# Patient Record
Sex: FEMALE | Race: WHITE | NOT HISPANIC OR LATINO | Employment: FULL TIME | URBAN - METROPOLITAN AREA
[De-identification: names, ages, dates, MRNs, and addresses within clinical notes are randomized per-mention and may not be internally consistent; named-entity substitution may affect disease eponyms.]

---

## 2018-08-17 ENCOUNTER — HOSPITAL ENCOUNTER (EMERGENCY)
Facility: HOSPITAL | Age: 33
Discharge: HOME | End: 2018-08-17
Attending: EMERGENCY MEDICINE | Admitting: EMERGENCY MEDICINE

## 2018-08-17 ENCOUNTER — APPOINTMENT (OUTPATIENT)
Dept: ULTRASOUND IMAGING | Facility: HOSPITAL | Age: 33
End: 2018-08-17

## 2018-08-17 VITALS
SYSTOLIC BLOOD PRESSURE: 122 MMHG | HEIGHT: 66 IN | WEIGHT: 152 LBS | TEMPERATURE: 96.9 F | OXYGEN SATURATION: 99 % | BODY MASS INDEX: 24.43 KG/M2 | HEART RATE: 69 BPM | DIASTOLIC BLOOD PRESSURE: 80 MMHG | RESPIRATION RATE: 16 BRPM

## 2018-08-17 DIAGNOSIS — R10.13 EPIGASTRIC ABDOMINAL PAIN: Primary | ICD-10-CM

## 2018-08-17 LAB
ALBUMIN SERPL-MCNC: 4.2 G/DL (ref 3.5–5.2)
ALBUMIN/GLOB SERPL: 1.6 G/DL
ALP SERPL-CCNC: 41 U/L (ref 39–117)
ALT SERPL W P-5'-P-CCNC: 12 U/L (ref 1–33)
ANION GAP SERPL CALCULATED.3IONS-SCNC: 12.8 MMOL/L
AST SERPL-CCNC: 13 U/L (ref 1–32)
BASOPHILS # BLD AUTO: 0 10*3/MM3 (ref 0–0.2)
BASOPHILS NFR BLD AUTO: 0 % (ref 0–1.5)
BILIRUB SERPL-MCNC: 0.4 MG/DL (ref 0.1–1.2)
BUN BLD-MCNC: 13 MG/DL (ref 6–20)
BUN/CREAT SERPL: 20 (ref 7–25)
CALCIUM SPEC-SCNC: 8.4 MG/DL (ref 8.6–10.5)
CHLORIDE SERPL-SCNC: 103 MMOL/L (ref 98–107)
CO2 SERPL-SCNC: 23.2 MMOL/L (ref 22–29)
CREAT BLD-MCNC: 0.65 MG/DL (ref 0.57–1)
DEPRECATED RDW RBC AUTO: 42.6 FL (ref 37–54)
EOSINOPHIL # BLD AUTO: 0.28 10*3/MM3 (ref 0–0.7)
EOSINOPHIL NFR BLD AUTO: 3.9 % (ref 0.3–6.2)
ERYTHROCYTE [DISTWIDTH] IN BLOOD BY AUTOMATED COUNT: 12.6 % (ref 11.7–13)
GFR SERPL CREATININE-BSD FRML MDRD: 106 ML/MIN/1.73
GLOBULIN UR ELPH-MCNC: 2.7 GM/DL
GLUCOSE BLD-MCNC: 93 MG/DL (ref 65–99)
HCG SERPL QL: NEGATIVE
HCT VFR BLD AUTO: 39.6 % (ref 35.6–45.5)
HGB BLD-MCNC: 13 G/DL (ref 11.9–15.5)
HOLD SPECIMEN: NORMAL
HOLD SPECIMEN: NORMAL
IMM GRANULOCYTES # BLD: 0.01 10*3/MM3 (ref 0–0.03)
IMM GRANULOCYTES NFR BLD: 0.1 % (ref 0–0.5)
LIPASE SERPL-CCNC: 19 U/L (ref 13–60)
LYMPHOCYTES # BLD AUTO: 2.54 10*3/MM3 (ref 0.9–4.8)
LYMPHOCYTES NFR BLD AUTO: 35.8 % (ref 19.6–45.3)
MCH RBC QN AUTO: 30.7 PG (ref 26.9–32)
MCHC RBC AUTO-ENTMCNC: 32.8 G/DL (ref 32.4–36.3)
MCV RBC AUTO: 93.4 FL (ref 80.5–98.2)
MONOCYTES # BLD AUTO: 0.79 10*3/MM3 (ref 0.2–1.2)
MONOCYTES NFR BLD AUTO: 11.1 % (ref 5–12)
NEUTROPHILS # BLD AUTO: 3.48 10*3/MM3 (ref 1.9–8.1)
NEUTROPHILS NFR BLD AUTO: 49.2 % (ref 42.7–76)
PLATELET # BLD AUTO: 213 10*3/MM3 (ref 140–500)
PMV BLD AUTO: 11.5 FL (ref 6–12)
POTASSIUM BLD-SCNC: 3.7 MMOL/L (ref 3.5–5.2)
PROT SERPL-MCNC: 6.9 G/DL (ref 6–8.5)
RBC # BLD AUTO: 4.24 10*6/MM3 (ref 3.9–5.2)
SODIUM BLD-SCNC: 139 MMOL/L (ref 136–145)
WBC NRBC COR # BLD: 7.09 10*3/MM3 (ref 4.5–10.7)
WHOLE BLOOD HOLD SPECIMEN: NORMAL
WHOLE BLOOD HOLD SPECIMEN: NORMAL

## 2018-08-17 PROCEDURE — 76705 ECHO EXAM OF ABDOMEN: CPT

## 2018-08-17 PROCEDURE — 85025 COMPLETE CBC W/AUTO DIFF WBC: CPT | Performed by: EMERGENCY MEDICINE

## 2018-08-17 PROCEDURE — 80053 COMPREHEN METABOLIC PANEL: CPT | Performed by: EMERGENCY MEDICINE

## 2018-08-17 PROCEDURE — 84703 CHORIONIC GONADOTROPIN ASSAY: CPT | Performed by: EMERGENCY MEDICINE

## 2018-08-17 PROCEDURE — 99284 EMERGENCY DEPT VISIT MOD MDM: CPT

## 2018-08-17 PROCEDURE — 83690 ASSAY OF LIPASE: CPT | Performed by: EMERGENCY MEDICINE

## 2018-08-17 RX ORDER — SODIUM CHLORIDE 0.9 % (FLUSH) 0.9 %
10 SYRINGE (ML) INJECTION AS NEEDED
Status: DISCONTINUED | OUTPATIENT
Start: 2018-08-17 | End: 2018-08-17 | Stop reason: HOSPADM

## 2018-08-17 RX ORDER — OMEPRAZOLE 40 MG/1
40 CAPSULE, DELAYED RELEASE ORAL DAILY
Qty: 14 CAPSULE | Refills: 0 | Status: SHIPPED | OUTPATIENT
Start: 2018-08-17 | End: 2018-08-31

## 2018-08-17 RX ORDER — ONDANSETRON 2 MG/ML
4 INJECTION INTRAMUSCULAR; INTRAVENOUS ONCE
Status: DISCONTINUED | OUTPATIENT
Start: 2018-08-17 | End: 2018-08-17 | Stop reason: HOSPADM

## 2018-08-17 RX ORDER — SUCRALFATE ORAL 1 G/10ML
1 SUSPENSION ORAL
Qty: 280 ML | Refills: 0 | Status: SHIPPED | OUTPATIENT
Start: 2018-08-17 | End: 2018-08-24

## 2018-08-17 RX ORDER — CETIRIZINE HYDROCHLORIDE 10 MG/1
10 TABLET ORAL DAILY
COMMUNITY

## 2018-08-17 RX ORDER — ALUMINA, MAGNESIA, AND SIMETHICONE 2400; 2400; 240 MG/30ML; MG/30ML; MG/30ML
15 SUSPENSION ORAL ONCE
Status: COMPLETED | OUTPATIENT
Start: 2018-08-17 | End: 2018-08-17

## 2018-08-17 RX ADMIN — ALUMINUM HYDROXIDE, MAGNESIUM HYDROXIDE, AND DIMETHICONE 15 ML: 400; 400; 40 SUSPENSION ORAL at 01:20

## 2018-08-17 RX ADMIN — LIDOCAINE HYDROCHLORIDE 15 ML: 20 SOLUTION ORAL; TOPICAL at 01:20

## 2018-08-17 NOTE — ED NOTES
Pt reports having RLQ X 2 days worse with palpation. Pt denies n/v/d. Pt reports last BM was today. Denies any difficulty urinating. Denies any blood in urine. Family at bedside. Pt alert and oriented X4. Call light within reach. Will continue to monitor.     Cecy Kilgore, RN  08/17/18 0033

## 2018-08-17 NOTE — ED PROVIDER NOTES
EMERGENCY DEPARTMENT ENCOUNTER    Room Number:  21/21  Date seen:  8/17/2018  Time seen: 12:24 AM  PCP: Ifeoma Mansfield MD  Historian: Patient      HPI:  Chief Complaint: Abd pain  Context: Lissa Koroma is a 33 y.o. female who presents to the ED c/o abd pain that onset 2 days ago. Pt describes the pain as a cramping. Pt reports the pain was severe and located in the epigastrium. She reports the pain returned yesterday and was more focused in the RUQ. She denies any improvement with BM. She states the pain is worse with food. Pt denies any N/V/D, fever, chills.     Pain Location: originally epigastric, now RUQ  Radiation: none  Quality: cramping  Intensity/Severity: severe  Duration: 2 days  Onset quality: cramping  Timing: waxing and waning  Progression: unchanged  Aggravating Factors: food  Alleviating Factors: none  Previous Episodes: none  Treatment before arrival: none  Associated Symptoms: none    PAST MEDICAL HISTORY  Active Ambulatory Problems     Diagnosis Date Noted   • No Active Ambulatory Problems     Resolved Ambulatory Problems     Diagnosis Date Noted   • No Resolved Ambulatory Problems     No Additional Past Medical History         PAST SURGICAL HISTORY  History reviewed. No pertinent surgical history.      FAMILY HISTORY  History reviewed. No pertinent family history.      SOCIAL HISTORY  Social History     Social History   • Marital status: Single     Spouse name: N/A   • Number of children: N/A   • Years of education: N/A     Occupational History   • Not on file.     Social History Main Topics   • Smoking status: Never Smoker   • Smokeless tobacco: Not on file   • Alcohol use No   • Drug use: No   • Sexual activity: Defer     Other Topics Concern   • Not on file     Social History Narrative   • No narrative on file         ALLERGIES  Patient has no known allergies.        REVIEW OF SYSTEMS  Review of Systems   Constitutional: Negative for fever.   HENT: Negative for sore throat.     Respiratory: Negative for shortness of breath.    Cardiovascular: Negative for chest pain.   Gastrointestinal: Positive for abdominal pain.   Endocrine: Negative for polyuria.   Genitourinary: Negative for dysuria.   Musculoskeletal: Negative for neck pain.   Skin: Negative for rash.   Neurological: Negative for headaches.   All other systems reviewed and are negative.           PHYSICAL EXAM  ED Triage Vitals [08/17/18 0018]   Temp Heart Rate Resp BP SpO2   96.9 °F (36.1 °C) 77 16 -- 98 %      Temp src Heart Rate Source Patient Position BP Location FiO2 (%)   Tympanic -- -- -- --         GENERAL: not distressed  HENT: nares patent  EYES: no scleral icterus  CV: regular rhythm, regular rate  RESPIRATORY: normal effort, CTAB  ABDOMEN: soft, RUQ and epigastric tenderness without rebound or guarding  MUSCULOSKELETAL: no deformity  NEURO: alert, IBARRA, FC  SKIN: warm, dry    Vital signs and nursing notes reviewed.          LAB RESULTS  Recent Results (from the past 24 hour(s))   Light Blue Top    Collection Time: 08/17/18 12:34 AM   Result Value Ref Range    Extra Tube hold for add-on    Green Top (Gel)    Collection Time: 08/17/18 12:34 AM   Result Value Ref Range    Extra Tube Hold for add-ons.    Lavender Top    Collection Time: 08/17/18 12:34 AM   Result Value Ref Range    Extra Tube hold for add-on    Comprehensive Metabolic Panel    Collection Time: 08/17/18 12:34 AM   Result Value Ref Range    Glucose 93 65 - 99 mg/dL    BUN 13 6 - 20 mg/dL    Creatinine 0.65 0.57 - 1.00 mg/dL    Sodium 139 136 - 145 mmol/L    Potassium 3.7 3.5 - 5.2 mmol/L    Chloride 103 98 - 107 mmol/L    CO2 23.2 22.0 - 29.0 mmol/L    Calcium 8.4 (L) 8.6 - 10.5 mg/dL    Total Protein 6.9 6.0 - 8.5 g/dL    Albumin 4.20 3.50 - 5.20 g/dL    ALT (SGPT) 12 1 - 33 U/L    AST (SGOT) 13 1 - 32 U/L    Alkaline Phosphatase 41 39 - 117 U/L    Total Bilirubin 0.4 0.1 - 1.2 mg/dL    eGFR Non African Amer 106 >60 mL/min/1.73    Globulin 2.7 gm/dL    A/G  Ratio 1.6 g/dL    BUN/Creatinine Ratio 20.0 7.0 - 25.0    Anion Gap 12.8 mmol/L   Lipase    Collection Time: 08/17/18 12:34 AM   Result Value Ref Range    Lipase 19 13 - 60 U/L   CBC Auto Differential    Collection Time: 08/17/18 12:34 AM   Result Value Ref Range    WBC 7.09 4.50 - 10.70 10*3/mm3    RBC 4.24 3.90 - 5.20 10*6/mm3    Hemoglobin 13.0 11.9 - 15.5 g/dL    Hematocrit 39.6 35.6 - 45.5 %    MCV 93.4 80.5 - 98.2 fL    MCH 30.7 26.9 - 32.0 pg    MCHC 32.8 32.4 - 36.3 g/dL    RDW 12.6 11.7 - 13.0 %    RDW-SD 42.6 37.0 - 54.0 fl    MPV 11.5 6.0 - 12.0 fL    Platelets 213 140 - 500 10*3/mm3    Neutrophil % 49.2 42.7 - 76.0 %    Lymphocyte % 35.8 19.6 - 45.3 %    Monocyte % 11.1 5.0 - 12.0 %    Eosinophil % 3.9 0.3 - 6.2 %    Basophil % 0.0 0.0 - 1.5 %    Immature Grans % 0.1 0.0 - 0.5 %    Neutrophils, Absolute 3.48 1.90 - 8.10 10*3/mm3    Lymphocytes, Absolute 2.54 0.90 - 4.80 10*3/mm3    Monocytes, Absolute 0.79 0.20 - 1.20 10*3/mm3    Eosinophils, Absolute 0.28 0.00 - 0.70 10*3/mm3    Basophils, Absolute 0.00 0.00 - 0.20 10*3/mm3    Immature Grans, Absolute 0.01 0.00 - 0.03 10*3/mm3   Gold Top - SST    Collection Time: 08/17/18 12:35 AM   Result Value Ref Range    Extra Tube Hold for add-ons.    hCG, Serum, Qualitative    Collection Time: 08/17/18 12:35 AM   Result Value Ref Range    HCG Qualitative Negative Indeterminate, Negative       Ordered the above labs and reviewed the results.        RADIOLOGY  US Gallbladder   Preliminary Result   No gallstones, no evidence for acute cholecystitis.                  Ordered the above noted radiological studies. Reviewed by me in PACS.           PROCEDURES  Procedures      MEDICATIONS GIVEN IN ER  Medications   sodium chloride 0.9 % flush 10 mL (not administered)   ondansetron (ZOFRAN) injection 4 mg (not administered)   aluminum-magnesium hydroxide-simethicone (MAALOX MAX) 400-400-40 MG/5ML suspension 15 mL (15 mL Oral Given 8/17/18 0120)   lidocaine viscous  (XYLOCAINE) 2 % mouth solution 15 mL (15 mL Mouth/Throat Given 8/17/18 0120)                   PROGRESS AND CONSULTS     1:45 AM  Rechecked with pt. Informed pt of her unremarkable labs and negative gallbladder US. She reports improvement of sxs with GI cocktail. On repeat exam abd is soft without signs of peritonitis.       MEDICAL DECISION MAKING      MDM  Number of Diagnoses or Management Options  Epigastric abdominal pain:   Diagnosis management comments: Differential includes hepatobiliary pathology, small bowel obstruction, Dami-Doug Collin, pancreatitis, gastritis and peptic ulcer disease.  Patient is somewhat improved following GI cocktail.  Her abdomen remains soft and not peritoneal can repeat abdominal exam.  I see no indication for CT scan this time.  Give the patient can return precautions and did offer CT scan.  However, after discussing the risks and benefits, patient prefers to avoid CT scan at this time and will follow-up should she develop any worsening of her pain.  We discussed the possibility, albeit unlikely, of early appendicitis.       Amount and/or Complexity of Data Reviewed  Clinical lab tests: reviewed and ordered (unremarkable)  Tests in the radiology section of CPT®: ordered and reviewed               DIAGNOSIS  Final diagnoses:   Epigastric abdominal pain         DISPOSITION  DISCHARGE    Patient discharged in stable condition.    Reviewed implications of results, diagnosis, meds, responsibility to follow up, warning signs and symptoms of possible worsening, potential complications and reasons to return to ER.    Patient/Family voiced understanding of above instructions.    Discussed plan for discharge, as there is no emergent indication for admission. Patient referred to primary care provider for BP management due to today's BP. Pt/family is agreeable and understands need for follow up and repeat testing.  Pt is aware that discharge does not mean that nothing is wrong but it indicates  no emergency is present that requires admission and they must continue care with follow-up as given below or physician of their choice.     FOLLOW-UP  Ifeoma Mansfield MD  [address]  [address]  [facility] [id number] [phone]    Call in 1 day  As needed    Andi Baxter MD  [address] [facility] [address]  [facility] [id number] [phone]    Call in 1 day  If symptoms worsen         Medication List      New Prescriptions    omeprazole 40 MG capsule  Commonly known as:  priLOSEC  Take 1 capsule by mouth Daily for 14 days.     sucralfate 1 GM/10ML suspension  Commonly known as:  CARAFATE  Take 10 mL by mouth 4 (Four) Times a Day With Meals & at Bedtime for 7   days.                      Latest Documented Vital Signs:  As of 1:53 AM  BP- 141/82 HR- 77 Temp- 96.9 °F (36.1 °C) (Tympanic) O2 sat- 98%        --  Documentation assistance provided by estuardo Brown for Dr. Carlos MD.  Information recorded by the scribe was done at my direction and has been verified and validated by me.                 Humble Brown  08/17/18 0153       Michael Gonzalez II, MD  08/17/18 [id number]

## 2018-08-17 NOTE — ED NOTES
Pt reports RLQ pain that started at 1600 yesterday. Pt denies n/v.      Marie Briceño, RN  08/17/18 0018